# Patient Record
Sex: FEMALE | Race: BLACK OR AFRICAN AMERICAN | Employment: FULL TIME | URBAN - METROPOLITAN AREA
[De-identification: names, ages, dates, MRNs, and addresses within clinical notes are randomized per-mention and may not be internally consistent; named-entity substitution may affect disease eponyms.]

---

## 2018-03-04 ENCOUNTER — HOSPITAL ENCOUNTER (EMERGENCY)
Age: 43
Discharge: HOME | End: 2018-03-04
Attending: EMERGENCY MEDICINE
Payer: SELF-PAY

## 2018-03-04 ENCOUNTER — APPOINTMENT (OUTPATIENT)
Dept: GENERAL RADIOLOGY | Age: 43
End: 2018-03-04
Attending: NURSE PRACTITIONER
Payer: SELF-PAY

## 2018-03-04 VITALS
BODY MASS INDEX: 34.02 KG/M2 | OXYGEN SATURATION: 100 % | TEMPERATURE: 97.7 F | WEIGHT: 243 LBS | HEART RATE: 64 BPM | RESPIRATION RATE: 18 BRPM | HEIGHT: 71 IN | SYSTOLIC BLOOD PRESSURE: 131 MMHG | DIASTOLIC BLOOD PRESSURE: 82 MMHG

## 2018-03-04 DIAGNOSIS — J04.0 LARYNGITIS: ICD-10-CM

## 2018-03-04 DIAGNOSIS — R05.9 COUGH: Primary | ICD-10-CM

## 2018-03-04 LAB
APPEARANCE UR: ABNORMAL
BILIRUB UR QL: NEGATIVE
COLOR UR: YELLOW
GLUCOSE UR STRIP.AUTO-MCNC: NEGATIVE MG/DL
HCG UR QL: NEGATIVE
HGB UR QL STRIP: NEGATIVE
KETONES UR QL STRIP.AUTO: ABNORMAL MG/DL
LEUKOCYTE ESTERASE UR QL STRIP.AUTO: NEGATIVE
NITRITE UR QL STRIP.AUTO: NEGATIVE
PH UR STRIP: 5 [PH] (ref 5–8)
PROT UR STRIP-MCNC: NEGATIVE MG/DL
SP GR UR REFRACTOMETRY: 1.03 (ref 1–1.03)
UROBILINOGEN UR QL STRIP.AUTO: 0.2 EU/DL (ref 0.2–1)

## 2018-03-04 PROCEDURE — 93005 ELECTROCARDIOGRAM TRACING: CPT

## 2018-03-04 PROCEDURE — 71046 X-RAY EXAM CHEST 2 VIEWS: CPT

## 2018-03-04 PROCEDURE — 81025 URINE PREGNANCY TEST: CPT | Performed by: NURSE PRACTITIONER

## 2018-03-04 PROCEDURE — 81003 URINALYSIS AUTO W/O SCOPE: CPT | Performed by: NURSE PRACTITIONER

## 2018-03-04 PROCEDURE — 99284 EMERGENCY DEPT VISIT MOD MDM: CPT

## 2018-03-04 RX ORDER — PHENTERMINE HYDROCHLORIDE 37.5 MG/1
37.5 TABLET ORAL
COMMUNITY

## 2018-03-04 RX ORDER — ALBUTEROL SULFATE 90 UG/1
2 AEROSOL, METERED RESPIRATORY (INHALATION)
Qty: 1 INHALER | Refills: 0 | Status: SHIPPED | OUTPATIENT
Start: 2018-03-04

## 2018-03-04 RX ORDER — MELOXICAM 15 MG/1
15 TABLET ORAL DAILY
COMMUNITY

## 2018-03-04 RX ORDER — BENZONATATE 100 MG/1
100 CAPSULE ORAL
Qty: 30 CAP | Refills: 0 | Status: SHIPPED | OUTPATIENT
Start: 2018-03-04 | End: 2018-03-11

## 2018-03-04 NOTE — ED PROVIDER NOTES
EMERGENCY DEPARTMENT HISTORY AND PHYSICAL EXAM    Date: 3/4/2018  Patient Name: Karely Hoyos    History of Presenting Illness     Chief Complaint   Patient presents with    Wheezing       History Provided By: Patient    Chief Complaint: SOB and Chest Pain  Duration: 3 Days  Timing:  Acute  Location: Chest  Quality: Tingling  Severity: 5 out of 10  Modifying Factors: Pt states it is worse with movement  Associated Symptoms: cough, chest pain (tingling), and wheezing    Additional History (Context):   2:49 PM  March Trina is a 35 y.o. female who presents to the emergency department C/O SOB and 5/10 tingling chest pain that is worse with movement onset 3 days ago. She only experiences the pain when she coughs and it is worse at night. Associated sxs include cough (non productive), sinus pain, wheezing, loss of voice, sneezing. Pt denies recent travel, recent hospitalization, leg pain, calf swelling, other respiratory problems, PMHx asthma, fever, chills, and any other sxs or complaints. Social Hx: - Smoking    PCP: None    Current Outpatient Prescriptions   Medication Sig Dispense Refill    phentermine (ADIPEX-P) 37.5 mg tablet Take 37.5 mg by mouth every morning.  meloxicam (MOBIC) 15 mg tablet Take 15 mg by mouth daily.  albuterol (PROVENTIL HFA, VENTOLIN HFA, PROAIR HFA) 90 mcg/actuation inhaler Take 2 Puffs by inhalation every four (4) hours as needed for Wheezing. 1 Inhaler 0    benzonatate (TESSALON PERLES) 100 mg capsule Take 1 Cap by mouth three (3) times daily as needed for Cough for up to 7 days. 30 Cap 0    FUROSEMIDE (LASIX PO) Take  by mouth. Past History     Past Medical History:  Past Medical History:   Diagnosis Date    Arthritis        Past Surgical History:  Past Surgical History:   Procedure Laterality Date    HX ORTHOPAEDIC      arthroscopy on left knee       Family History:  History reviewed. No pertinent family history.     Social History:  Social History Substance Use Topics    Smoking status: Never Smoker    Smokeless tobacco: Never Used    Alcohol use No       Allergies:  No Known Allergies      Review of Systems   Review of Systems   Constitutional: Negative for chills and fever. HENT: Positive for sinus pain and voice change (loss of voice). Respiratory: Positive for cough (non productive), shortness of breath and wheezing. Cardiovascular: Positive for chest pain. Negative for leg swelling. All other systems reviewed and are negative. Physical Exam     Vitals:    03/04/18 1444 03/04/18 1702   BP: (!) 130/24 131/82   Pulse: 76 64   Resp: 18 18   Temp: 97.7 °F (36.5 °C)    SpO2: 100% 100%   Weight: 110.2 kg (243 lb)    Height: 5' 11\" (1.803 m)      Physical Exam   Constitutional: She is oriented to person, place, and time. She appears well-developed and well-nourished. Patient losing voice, able to speak in full sentences without difficulty    HENT:   Head: Normocephalic and atraumatic. Right Ear: Hearing and tympanic membrane normal.   Left Ear: Hearing and tympanic membrane normal.   Nose: Nose normal.   Mouth/Throat:       Eyes: Conjunctivae are normal.   Neck: Normal range of motion. Cardiovascular: Normal rate and regular rhythm. Pulmonary/Chest: Effort normal and breath sounds normal. No respiratory distress. She has no wheezes. Abdominal: Soft. Bowel sounds are normal. There is no tenderness. Musculoskeletal: Normal range of motion. Neurological: She is alert and oriented to person, place, and time. Skin: Skin is warm and dry. Nursing note and vitals reviewed. Will obtain chest Xray to evaluate for any signs of PNA.  Will obtain baseline EKG    Diagnostic Study Results     Labs -     Recent Results (from the past 12 hour(s))   URINALYSIS W/ RFLX MICROSCOPIC    Collection Time: 03/04/18  3:00 PM   Result Value Ref Range    Color YELLOW      Appearance CLOUDY      Specific gravity 1.029 1.005 - 1.030      pH (UA) 5.0 5.0 - 8.0      Protein NEGATIVE  NEG mg/dL    Glucose NEGATIVE  NEG mg/dL    Ketone TRACE (A) NEG mg/dL    Bilirubin NEGATIVE  NEG      Blood NEGATIVE  NEG      Urobilinogen 0.2 0.2 - 1.0 EU/dL    Nitrites NEGATIVE  NEG      Leukocyte Esterase NEGATIVE  NEG     HCG URINE, QL    Collection Time: 03/04/18  3:00 PM   Result Value Ref Range    HCG urine, QL NEGATIVE  NEG     EKG, 12 LEAD, INITIAL    Collection Time: 03/04/18  3:02 PM   Result Value Ref Range    Ventricular Rate 69 BPM    Atrial Rate 69 BPM    P-R Interval 146 ms    QRS Duration 78 ms    Q-T Interval 398 ms    QTC Calculation (Bezet) 426 ms    Calculated P Axis 52 degrees    Calculated R Axis 77 degrees    Calculated T Axis 72 degrees    Diagnosis       Normal sinus rhythm with sinus arrhythmia  Normal ECG  No previous ECGs available         Radiologic Studies -   XR CHEST PA LAT   Final Result      4:50 PM  RADIOLOGY FINDINGS  Chest X-ray shows no acute pulmonary process  Pending review by Radiologist  Recorded by Krunal Black, ED Scribe, as dictated by Christine Segura FNP-BC    CT Results  (Last 48 hours)    None        CXR Results  (Last 48 hours)               03/04/18 1633  XR CHEST PA LAT Final result    Impression:  IMPRESSION: No acute findings in the chest.       Narrative:  PA and lateral chest radiograph       Comparison: None       Indication: Cough. Findings: Normal appearance of heart and mediastinum. Pleural spaces appear   clear. No suspicious pulmonary opacity. No acute osseous abnormality. Medications given in the ED-  Medications - No data to display      Medical Decision Making   I am the first provider for this patient. I reviewed the vital signs, available nursing notes, past medical history, past surgical history, family history and social history. Vital Signs-Reviewed the patient's vital signs.     Pulse Oximetry Analysis - 100% on Room Air     EKG interpretation: (Preliminary)  2:41 PM   69 BPM, NSR, no STEMI, no ST elevation  EKG read by Edy COSTELLO at 3:09 PM     Records Reviewed: Nursing Notes    Provider Notes (Medical Decision Making):     Procedures:  Procedures    ED Course:   2:49 PM   Initial assessment performed. The patients presenting problems have been discussed, and they are in agreement with the care plan formulated and outlined with them. I have encouraged them to ask questions as they arise throughout their visit. 4:50 PM  Pt updated on chest xray, pt continues to be laughing in room with friend, showing no signs of distress. Lungs continue to be clear to auscultation, pt is requesting discharge and albuterol inhaler. I have explained that she is not  Having wheezing at this time, but she states at night she wheezes worse, will give Tessalon Pearls for cough as well as albuterol inhaler and pt understands reasons to return. Diagnosis and Disposition       DISCHARGE NOTE:  4:54 PM  Charlotte Cordon's  results have been reviewed with her. She has been counseled regarding her diagnosis, treatment, and plan. She verbally conveys understanding and agreement of the signs, symptoms, diagnosis, treatment and prognosis and additionally agrees to follow up as discussed. She also agrees with the care-plan and conveys that all of her questions have been answered. I have also provided discharge instructions for her that include: educational information regarding their diagnosis and treatment, and list of reasons why they would want to return to the ED prior to their follow-up appointment, should her condition change. She has been provided with education for proper emergency department utilization. CLINICAL IMPRESSION:    1. Cough    2. Laryngitis        PLAN:  1. D/C Home  2.    Discharge Medication List as of 3/4/2018  4:54 PM      START taking these medications    Details   albuterol (PROVENTIL HFA, VENTOLIN HFA, PROAIR HFA) 90 mcg/actuation inhaler Take 2 Puffs by inhalation every four (4) hours as needed for Wheezing., Print, Disp-1 Inhaler, R-0      benzonatate (TESSALON PERLES) 100 mg capsule Take 1 Cap by mouth three (3) times daily as needed for Cough for up to 7 days. , Print, Disp-30 Cap, R-0         CONTINUE these medications which have NOT CHANGED    Details   phentermine (ADIPEX-P) 37.5 mg tablet Take 37.5 mg by mouth every morning., Historical Med      meloxicam (MOBIC) 15 mg tablet Take 15 mg by mouth daily. , Historical Med      FUROSEMIDE (LASIX PO) Take  by mouth., Historical Med           3. Follow-up Information     Follow up With Details Comments [facility], DO Schedule an appointment as soon as possible for a visit in 2 days For primary care follow up [address]      [facility] EMERGENCY DEPT Go to As needed, if symptoms worsen [address] [address]  [phone]        _______________________________    Attestations: This note is prepared by Donovan Epstein, acting as Scribe for [facility]. [facility]:  The scribe's documentation has been prepared under my direction and personally reviewed by me in its entirety.   I confirm that the note above accurately reflects all work, treatment, procedures, and medical decision making performed by me.  _______________________________

## 2018-03-04 NOTE — ED TRIAGE NOTES
C/o sob associated with cough onset 3 days ago, pt states she can hear herself wheezing. Pt states worse at night  Sepsis Screening completed    (  )Patient meets SIRS criteria. ( x )Patient does not meet SIRS criteria.       SIRS Criteria is achieved when two or more of the following are present   Temperature < 96.8°F (36°C) or > 100.9°F (38.3°C)   Heart Rate > 90 beats per minute   Respiratory Rate > 20 breaths per minute   WBC count > 12,000 or <4,000 or > 10% bands

## 2018-03-04 NOTE — ED NOTES
Discharge instructions reviewed with the patient with opportunity for questions given. The patient verbalized understanding. Patient armband removed and shredded. Patient in stable condition at time of discharge, speaking in full, clear sentences.

## 2018-03-04 NOTE — ED NOTES
Urine cup given to pt with instructions to provide specimen, verbalized understanding. Pt in NAD at this time.

## 2018-03-11 LAB
ATRIAL RATE: 69 BPM
CALCULATED P AXIS, ECG09: 52 DEGREES
CALCULATED R AXIS, ECG10: 77 DEGREES
CALCULATED T AXIS, ECG11: 72 DEGREES
DIAGNOSIS, 93000: NORMAL
P-R INTERVAL, ECG05: 146 MS
Q-T INTERVAL, ECG07: 398 MS
QRS DURATION, ECG06: 78 MS
QTC CALCULATION (BEZET), ECG08: 426 MS
VENTRICULAR RATE, ECG03: 69 BPM

## 2019-01-21 ENCOUNTER — HOSPITAL ENCOUNTER (EMERGENCY)
Age: 44
Discharge: HOME | End: 2019-01-21
Attending: EMERGENCY MEDICINE
Payer: SELF-PAY

## 2019-01-21 VITALS
HEIGHT: 71 IN | SYSTOLIC BLOOD PRESSURE: 130 MMHG | WEIGHT: 256 LBS | OXYGEN SATURATION: 100 % | RESPIRATION RATE: 16 BRPM | HEART RATE: 69 BPM | BODY MASS INDEX: 35.84 KG/M2 | TEMPERATURE: 97.2 F | DIASTOLIC BLOOD PRESSURE: 75 MMHG

## 2019-01-21 DIAGNOSIS — H10.31 ACUTE BACTERIAL CONJUNCTIVITIS OF RIGHT EYE: Primary | ICD-10-CM

## 2019-01-21 PROCEDURE — 99282 EMERGENCY DEPT VISIT SF MDM: CPT

## 2019-01-21 RX ORDER — POLYMYXIN B SULFATE AND TRIMETHOPRIM 1; 10000 MG/ML; [USP'U]/ML
1 SOLUTION OPHTHALMIC EVERY 4 HOURS
Qty: 10 ML | Refills: 0 | Status: SHIPPED | OUTPATIENT
Start: 2019-01-21

## 2019-01-21 NOTE — LETTER
[facility] [place] 
[facility] 
[address] [id number] [phone] Work/School Note Date: 1/21/2019 To Whom It May concern: 
 
Clarice Zhu was seen and treated today in the emergency room by the following provider(s): 
Attending Provider: Efren Batista MD 
Nurse Practitioner: Arnol Melvin NP. Clarice Zhu may return to work on 1/23/2019. Sincerely, [facility]

## 2019-01-21 NOTE — ED PROVIDER NOTES
EMERGENCY DEPARTMENT HISTORY AND PHYSICAL EXAM 
 
Date: 1/21/2019 Patient Name: Shaggy Mott History of Presenting Illness Chief Complaint Patient presents with  Eye Problem History Provided By: patient Chief Complaint: eye redness Duration: 2 days Timing: acute Location: right Severity: 5/10 pain Modifying Factors: none Associated Symptoms: right eye drainage and discharge Additional History (Context):  
1:06 PM 
Shaggy Mott is a 44 y.o. female with PMHX of arthritis who presents to the emergency department C/O right eye redness that started 2 days ago. Associated sxs include right eye discharge and 5/10 right eye pain. Pt denies vision changes, fever, chills, and any other sxs or complaints. PCP: None Current Outpatient Medications Medication Sig Dispense Refill  trimethoprim-polymyxin b (POLYTRIM) ophthalmic solution Administer 1 Drop to right eye every four (4) hours. 10 mL 0  phentermine (ADIPEX-P) 37.5 mg tablet Take 37.5 mg by mouth every morning.  meloxicam (MOBIC) 15 mg tablet Take 15 mg by mouth daily.  FUROSEMIDE (LASIX PO) Take  by mouth.  albuterol (PROVENTIL HFA, VENTOLIN HFA, PROAIR HFA) 90 mcg/actuation inhaler Take 2 Puffs by inhalation every four (4) hours as needed for Wheezing. 1 Inhaler 0 Past History Past Medical History: 
Past Medical History:  
Diagnosis Date  Arthritis Past Surgical History: 
Past Surgical History:  
Procedure Laterality Date  HX ORTHOPAEDIC    
 arthroscopy on left knee Family History: 
History reviewed. No pertinent family history. Social History: 
Social History Tobacco Use  Smoking status: Never Smoker  Smokeless tobacco: Never Used Substance Use Topics  Alcohol use: No  
 Drug use: No  
 
 
Allergies: 
No Known Allergies Review of Systems Review of Systems Constitutional: Negative for chills and fever. Eyes: Positive for pain (5/10), discharge and redness. Negative for visual disturbance. All other systems reviewed and are negative. Physical Exam  
 
Vitals:  
 01/21/19 1254 BP: 130/75 Pulse: 69 Resp: 16 Temp: 97.2 °F (36.2 °C) SpO2: 100% Weight: 116.1 kg (256 lb) Height: 5' 11\" (1.803 m) Physical Exam  
Constitutional: She is oriented to person, place, and time. She appears well-developed and well-nourished. HENT:  
Head: Normocephalic and atraumatic. Eyes: EOM are normal. Pupils are equal, round, and reactive to light. Neck: Normal range of motion. Neck supple. Pulmonary/Chest: Effort normal.  
Musculoskeletal: Normal range of motion. Neurological: She is alert and oriented to person, place, and time. Skin: Skin is warm and dry. Nursing note and vitals reviewed. Diagnostic Study Results Labs - No results found for this or any previous visit (from the past 12 hour(s)). Radiologic Studies - No orders to display Medications given in the ED- Medications - No data to display Medical Decision Making I am the first provider for this patient. I reviewed the vital signs, available nursing notes, past medical history, past surgical history, family history and social history. Vital Signs-Reviewed the patient's vital signs. Pulse Oximetry Analysis - 100% on RA Records Reviewed: Nursing Notes Provider Notes (Medical Decision Making): Patient presents with eye redness, itching and d/c. She denies pain, visual change or red flag symptoms. Exam consistent with conjunctivitis, will treat with drops. She was given Optho follow up, understands reasons to return and is offering no questions or concerns Procedures: 
Procedures ED Course:  
1:06 PM Initial assessment performed.  The patients presenting problems have been discussed, and they are in agreement with the care plan formulated and outlined with them. I have encouraged them to ask questions as they arise throughout their visit. Diagnosis and Disposition DISCHARGE NOTE: 
1:23 PM 
Bridgette Cordon's  results have been reviewed with her. She has been counseled regarding her diagnosis, treatment, and plan. She verbally conveys understanding and agreement of the signs, symptoms, diagnosis, treatment and prognosis and additionally agrees to follow up as discussed. She also agrees with the care-plan and conveys that all of her questions have been answered. I have also provided discharge instructions for her that include: educational information regarding their diagnosis and treatment, and list of reasons why they would want to return to the ED prior to their follow-up appointment, should her condition change. She has been provided with education for proper emergency department utilization. CLINICAL IMPRESSION: 
 
1. Acute bacterial conjunctivitis of right eye PLAN: 
1. D/C Home 2. Discharge Medication List as of 1/21/2019  1:32 PM  
  
START taking these medications Details  
trimethoprim-polymyxin b (POLYTRIM) ophthalmic solution Administer 1 Drop to right eye every four (4) hours. , Print, Disp-10 mL, R-0  
  
  
CONTINUE these medications which have NOT CHANGED Details  
phentermine (ADIPEX-P) 37.5 mg tablet Take 37.5 mg by mouth every morning., Historical Med  
  
meloxicam (MOBIC) 15 mg tablet Take 15 mg by mouth daily. , Historical Med FUROSEMIDE (LASIX PO) Take  by mouth., Historical Med  
  
albuterol (PROVENTIL HFA, VENTOLIN HFA, PROAIR HFA) 90 mcg/actuation inhaler Take 2 Puffs by inhalation every four (4) hours as needed for Wheezing., Print, Disp-1 Inhaler, R-0  
  
  
 
3. Follow-up Information Follow up With Specialties Details Why Contact Info  Kitty Gutierrez MD Ophthalmology Schedule an appointment as soon as possible for a visit in 2 days for ophthalmology follow up [address] [facility] [address] 
[phone] [facility] CLINIC  In 3 days For follow up at [facility] [address], [address] [place] [address] 
[phone] [facility] EMERGENCY DEPT Emergency Medicine  As needed, if symptoms worsen [address] [place] [address] 
[phone]  
  
 
_______________________________ Attestations: This note is prepared by Samira Hickey, acting as Scribe for [facility]. [facility]:  The scribe's documentation has been prepared under my direction and personally reviewed by me in its entirety. I confirm that the note above accurately reflects all work, treatment, procedures, and medical decision making performed by me. 
_______________________________

## 2019-01-21 NOTE — ED TRIAGE NOTES
Pt ambulatory into ER c/o right eye pain, reddness, swelling and drainage that began yesterday. Pt reports the eye is very \"itchy\" as well.

## 2019-01-21 NOTE — DISCHARGE INSTRUCTIONS
Follow up as directed   Use drops as directed  This is contagious, clean pillow cases and wash hands frequently   Return to the ED for increased pain, swelling, increased redness, visual changes or worsening of symptoms     Pinkeye: Care Instructions  Your Care Instructions    Pinkeye is redness and swelling of the eye surface and the conjunctiva (the lining of the eyelid and the covering of the white part of the eye). Pinkeye is also called conjunctivitis. Pinkeye is often caused by infection with bacteria or a virus. Dry air, allergies, smoke, and chemicals are other common causes. Pinkeye often clears on its own in 7 to 10 days. Antibiotics only help if the pinkeye is caused by bacteria. Pinkeye caused by infection spreads easily. If an allergy or chemical is causing pinkeye, it will not go away unless you can avoid whatever is causing it. Follow-up care is a key part of your treatment and safety. Be sure to make and go to all appointments, and call your doctor if you are having problems. It's also a good idea to know your test results and keep a list of the medicines you take. How can you care for yourself at home? · Wash your hands often. Always wash them before and after you treat pinkeye or touch your eyes or face. · Use moist cotton or a clean, wet cloth to remove crust. Wipe from the inside corner of the eye to the outside. Use a clean part of the cloth for each wipe. · Put cold or warm wet cloths on your eye a few times a day if the eye hurts. · Do not wear contact lenses or eye makeup until the pinkeye is gone. Throw away any eye makeup you were using when you got pinkeye. Clean your contacts and storage case. If you wear disposable contacts, use a new pair when your eye has cleared and it is safe to wear contacts again. · If the doctor gave you antibiotic ointment or eyedrops, use them as directed. Use the medicine for as long as instructed, even if your eye starts looking better soon.  Keep the bottle tip clean, and do not let it touch the eye area. · To put in eyedrops or ointment:  ? Tilt your head back, and pull your lower eyelid down with one finger. ? Drop or squirt the medicine inside the lower lid. ? Close your eye for 30 to 60 seconds to let the drops or ointment move around. ? Do not touch the ointment or dropper tip to your eyelashes or any other surface. · Do not share towels, pillows, or washcloths while you have pinkeye. When should you call for help? Call your doctor now or seek immediate medical care if:    · You have pain in your eye, not just irritation on the surface.     · You have a change in vision or loss of vision.     · You have an increase in discharge from the eye.     · Your eye has not started to improve or begins to get worse within 48 hours after you start using antibiotics.     · Pinkeye lasts longer than 7 days.    Watch closely for changes in your health, and be sure to contact your doctor if you have any problems. Where can you learn more? Go to http://irish-srini.info/. Enter Y392 in the search box to learn more about \"Pinkeye: Care Instructions. \"  Current as of: September 23, 2018  Content Version: 11.9  © 1199-7815 [employer]. Care instructions adapted under license by [employer] (which disclaims liability or warranty for this information). If you have questions about a medical condition or this instruction, always ask your healthcare professional. [place] [address] any warranty or liability for your use of this information.